# Patient Record
Sex: MALE | Race: WHITE | NOT HISPANIC OR LATINO | Employment: FULL TIME | URBAN - NONMETROPOLITAN AREA
[De-identification: names, ages, dates, MRNs, and addresses within clinical notes are randomized per-mention and may not be internally consistent; named-entity substitution may affect disease eponyms.]

---

## 2019-04-25 ENCOUNTER — HOSPITAL ENCOUNTER (OUTPATIENT)
Facility: HOSPITAL | Age: 54
Setting detail: HOSPITAL OUTPATIENT SURGERY
End: 2019-04-25
Attending: INTERNAL MEDICINE | Admitting: INTERNAL MEDICINE

## 2019-09-12 ENCOUNTER — ANESTHESIA (OUTPATIENT)
Dept: GASTROENTEROLOGY | Facility: HOSPITAL | Age: 54
End: 2019-09-12

## 2019-09-12 ENCOUNTER — HOSPITAL ENCOUNTER (OUTPATIENT)
Facility: HOSPITAL | Age: 54
Setting detail: HOSPITAL OUTPATIENT SURGERY
Discharge: HOME | End: 2019-09-12
Attending: INTERNAL MEDICINE | Admitting: INTERNAL MEDICINE

## 2019-09-12 ENCOUNTER — ANESTHESIA EVENT (OUTPATIENT)
Dept: GASTROENTEROLOGY | Facility: HOSPITAL | Age: 54
End: 2019-09-12

## 2019-09-12 VITALS
RESPIRATION RATE: 18 BRPM | SYSTOLIC BLOOD PRESSURE: 109 MMHG | OXYGEN SATURATION: 99 % | BODY MASS INDEX: 28.58 KG/M2 | WEIGHT: 193 LBS | DIASTOLIC BLOOD PRESSURE: 62 MMHG | HEART RATE: 55 BPM | HEIGHT: 69 IN | TEMPERATURE: 97.4 F

## 2019-09-12 PROCEDURE — 25010000002 PROPOFOL 10 MG/ML EMULSION: Performed by: NURSE ANESTHETIST, CERTIFIED REGISTERED

## 2019-09-12 RX ORDER — LIDOCAINE HYDROCHLORIDE 20 MG/ML
INJECTION, SOLUTION INTRAVENOUS AS NEEDED
Status: DISCONTINUED | OUTPATIENT
Start: 2019-09-12 | End: 2019-09-12 | Stop reason: SURG

## 2019-09-12 RX ORDER — PROPOFOL 10 MG/ML
VIAL (ML) INTRAVENOUS AS NEEDED
Status: DISCONTINUED | OUTPATIENT
Start: 2019-09-12 | End: 2019-09-12 | Stop reason: SURG

## 2019-09-12 RX ORDER — DEXTROSE AND SODIUM CHLORIDE 5; .45 G/100ML; G/100ML
30 INJECTION, SOLUTION INTRAVENOUS CONTINUOUS PRN
Status: DISCONTINUED | OUTPATIENT
Start: 2019-09-12 | End: 2019-09-12 | Stop reason: HOSPADM

## 2019-09-12 RX ORDER — ONDANSETRON 2 MG/ML
4 INJECTION INTRAMUSCULAR; INTRAVENOUS ONCE AS NEEDED
Status: DISCONTINUED | OUTPATIENT
Start: 2019-09-12 | End: 2019-09-12 | Stop reason: HOSPADM

## 2019-09-12 RX ADMIN — LIDOCAINE HYDROCHLORIDE 60 MG: 20 INJECTION, SOLUTION INTRAVENOUS at 08:18

## 2019-09-12 RX ADMIN — PROPOFOL 20 MG: 10 INJECTION, EMULSION INTRAVENOUS at 08:20

## 2019-09-12 RX ADMIN — PROPOFOL 20 MG: 10 INJECTION, EMULSION INTRAVENOUS at 08:28

## 2019-09-12 RX ADMIN — PROPOFOL 20 MG: 10 INJECTION, EMULSION INTRAVENOUS at 08:24

## 2019-09-12 RX ADMIN — PROPOFOL 20 MG: 10 INJECTION, EMULSION INTRAVENOUS at 08:26

## 2019-09-12 RX ADMIN — DEXTROSE AND SODIUM CHLORIDE 30 ML/HR: 5; 450 INJECTION, SOLUTION INTRAVENOUS at 08:10

## 2019-09-12 RX ADMIN — PROPOFOL 20 MG: 10 INJECTION, EMULSION INTRAVENOUS at 08:22

## 2019-09-12 RX ADMIN — PROPOFOL 80 MG: 10 INJECTION, EMULSION INTRAVENOUS at 08:18

## 2019-09-12 RX ADMIN — PROPOFOL 20 MG: 10 INJECTION, EMULSION INTRAVENOUS at 08:19

## 2019-09-12 NOTE — ANESTHESIA POSTPROCEDURE EVALUATION
Patient: Alex Guadalupe    Procedure Summary     Date:  09/12/19 Room / Location:  [facility] ENDOSCOPY 2 / [facility] ENDOSCOPY    Anesthesia Start:  0817 Anesthesia Stop:  0832    Procedure:  COLONOSCOPY (N/A ) Diagnosis:       Screen for colon cancer      (Screen for colon cancer [Z12.11])    Surgeon:  Keny Cardona DO Provider:  Tyrese Newton CRNA    Anesthesia Type:  MAC ASA Status:  1          Anesthesia Type: MAC  Last vitals  BP   128/75 (09/12/19 0800)   Temp   97 °F (36.1 °C) (09/12/19 0800)   Pulse   62 (09/12/19 0800)   Resp   18 (09/12/19 0800)     SpO2   97 % (09/12/19 0800)     Post Anesthesia Care and Evaluation    Patient location during evaluation: PACU  Level of consciousness: sleepy but conscious  Pain score: 0  Pain management: adequate  Airway patency: patent  Anesthetic complications: No anesthetic complications  PONV Status: none  Cardiovascular status: acceptable and hemodynamically stable  Respiratory status: acceptable and spontaneous ventilation  Hydration status: acceptable

## 2019-09-12 NOTE — H&P
Miley Diggs DO,[facility]  Gastroenterology  Hepatology  Endoscopy  Board Certified in Internal Medicine and gastroenterology  [address], [address]  [place]. [address]  - [phone]   F - [phone]     GASTROENTEROLOGY HISTORY AND PHYSICAL  NOTE   MILEY DIGGS DO.         SUBJECTIVE:   9/12/2019    Name: Alex Guadalupe  DOD: 1965        Chief Complaint:     Subjective : Screening for colon cancer    Patient is 54 y.o. male presents with desire for elective colonoscopy.      ROS/HISTORY/ CURRENT MEDICATIONS/OBJECTIVE/VS/PE:   Review of Systems:  All systems unremarkable unless specified below.  Constitutional   HENT  Eyes   Respiratory    Cardiovascular  Gastrointestinal   Endocrine  Genitourinary    Musculoskeletal   Skin  Allergic/Immunologic    Neurological    Hematological  Psychiatric/Behavioral    History:   History reviewed. No pertinent past medical history.  Past Surgical History:   Procedure Laterality Date   • KNEE ARTHROSCOPY W/ MENISCECTOMY       Family History   Problem Relation Age of Onset   • Dementia Mother    • Colon polyps Father      Social History     Tobacco Use   • Smoking status: Never Smoker   • Smokeless tobacco: Never Used   Substance Use Topics   • Alcohol use: No     Frequency: Never   • Drug use: No     Medications Prior to Admission   Medication Sig Dispense Refill Last Dose   • Multiple Vitamins-Minerals (DAILY MULTIVITAMIN PO) Take 1 tablet by mouth Daily.   9/9/2019     Allergies:  Latex    I have reviewed the patients medical history, surgical history and family history in the available medical record system.     Current Medications:     Current Facility-Administered Medications   Medication Dose Route Frequency Provider Last Rate Last Dose   • dextrose 5 % and sodium chloride 0.45 % infusion  30 mL/hr Intravenous Continuous PRN Miley Diggs DO           Objective     Physical Exam:        Physical Exam:  General Appearance:    Alert,  cooperative, in no acute distress   Head:    Normocephalic, without obvious abnormality, atraumatic   Eyes:            Lids and lashes normal, conjunctivae and sclerae normal, no   icterus, no pallor, corneas clear, PERRLA   Ears:    Ears appear intact with no abnormalities noted   Throat:   No oral lesions, no thrush, oral mucosa moist   Neck:   No adenopathy, supple, trachea midline, no thyromegaly, no     carotid bruit, no JVD   Back:     No kyphosis present, no scoliosis present, no skin lesions,       erythema or scars, no tenderness to percussion or                   palpation,   range of motion normal   Lungs:     Clear to auscultation,respirations regular, even and                   unlabored    Heart:    Regular rhythm and normal rate, normal S1 and S2, no            murmur, no gallop, no rub, no click   Breast Exam:    Deferred   Abdomen:     Normal bowel sounds, no masses, no organomegaly, soft        non-tender, non-distended, no guarding, no rebound                 tenderness   Genitalia:    Deferred   Extremities:   Moves all extremities well, no edema, no cyanosis, no              redness   Pulses:   Pulses palpable and equal bilaterally   Skin:   No bleeding, bruising or rash   Lymph nodes:   No palpable adenopathy   Neurologic:   Cranial nerves 2 - 12 grossly intact, sensation intact, DTR        present and equal bilaterally      Results Review:     No results found for: WBC, HGB, HCT, PLT          No results found for: LIPASE  No results found for: INR       Radiology Review:  Imaging Results (last 72 hours)     ** No results found for the last 72 hours. **           I reviewed the patient's new clinical results.  I reviewed the patient's new imaging results and agree with the interpretation.     ASSESSMENT/PLAN:   ASSESSMENT:  1.  Screen for colon cancer    PLAN:  1.  Colonoscopy    Risk and benefits associated with the procedure are reviewed with the patient.  Patient wished to proceed      Keny Cardona DO  09/12/19  7:58 AM

## 2019-09-12 NOTE — ANESTHESIA PREPROCEDURE EVALUATION
Anesthesia Evaluation     Patient summary reviewed and Nursing notes reviewed   NPO Solid Status: > 8 hours  NPO Liquid Status: > 2 hours           Airway   Mallampati: II  TM distance: >3 FB  Dental      Pulmonary - negative pulmonary ROS and normal exam   Cardiovascular - negative cardio ROS and normal exam        Neuro/Psych- negative ROS  GI/Hepatic/Renal/Endo - negative ROS     Musculoskeletal (-) negative ROS    Abdominal  - normal exam   Substance History - negative use     OB/GYN          Other - negative ROS                       Anesthesia Plan    ASA 1     MAC     intravenous induction   Anesthetic plan, all risks, benefits, and alternatives have been provided, discussed and informed consent has been obtained with: patient.

## 2019-11-29 ENCOUNTER — HOSPITAL ENCOUNTER (EMERGENCY)
Facility: HOSPITAL | Age: 54
Discharge: HOME | End: 2019-11-29
Attending: EMERGENCY MEDICINE | Admitting: EMERGENCY MEDICINE

## 2019-11-29 VITALS
WEIGHT: 201 LBS | RESPIRATION RATE: 18 BRPM | HEIGHT: 69 IN | BODY MASS INDEX: 29.77 KG/M2 | DIASTOLIC BLOOD PRESSURE: 88 MMHG | HEART RATE: 57 BPM | SYSTOLIC BLOOD PRESSURE: 141 MMHG | TEMPERATURE: 97.9 F | OXYGEN SATURATION: 99 %

## 2019-11-29 DIAGNOSIS — R04.0 LEFT-SIDED NOSEBLEED: Primary | ICD-10-CM

## 2019-11-29 PROCEDURE — 99283 EMERGENCY DEPT VISIT LOW MDM: CPT

## 2019-11-29 RX ORDER — LIDOCAINE HYDROCHLORIDE 40 MG/ML
1 SOLUTION TOPICAL ONCE
Status: COMPLETED | OUTPATIENT
Start: 2019-11-29 | End: 2019-11-29

## 2019-11-29 RX ADMIN — SILVER NITRATE APPLICATORS 1 APPLICATION: 25; 75 STICK TOPICAL at 17:04

## 2019-11-29 RX ADMIN — PHENYLEPHRINE HYDROCHLORIDE 2 SPRAY: 0.5 SPRAY NASAL at 17:04

## 2019-11-29 RX ADMIN — LIDOCAINE HYDROCHLORIDE 1 APPLICATION: 40 SOLUTION TOPICAL at 17:04

## 2019-12-04 ENCOUNTER — OFFICE VISIT (OUTPATIENT)
Dept: OTOLARYNGOLOGY | Facility: CLINIC | Age: 54
End: 2019-12-04

## 2019-12-04 VITALS
SYSTOLIC BLOOD PRESSURE: 122 MMHG | HEIGHT: 69 IN | BODY MASS INDEX: 29.59 KG/M2 | DIASTOLIC BLOOD PRESSURE: 84 MMHG | WEIGHT: 199.8 LBS

## 2019-12-04 DIAGNOSIS — R04.0 NASAL BLEEDING: Primary | ICD-10-CM

## 2019-12-04 DIAGNOSIS — J34.2 NASAL SEPTAL DEFORMITY: ICD-10-CM

## 2019-12-04 PROCEDURE — 99203 OFFICE O/P NEW LOW 30 MIN: CPT | Performed by: OTOLARYNGOLOGY

## 2019-12-04 PROCEDURE — 30901 CONTROL OF NOSEBLEED: CPT | Performed by: OTOLARYNGOLOGY

## 2019-12-04 RX ORDER — CHLORAL HYDRATE 500 MG
CAPSULE ORAL
COMMUNITY

## 2019-12-08 NOTE — PROGRESS NOTES
Subjective   Alex Guadalupe is a 54 y.o. male.     History of Present Illness   Patient reports she has been having left-sided nosebleeds lately.  Has been going on for a couple of weeks.  Went to the emergency room this past Friday and had silver nitrate cautery on the left however had recurrent bleeding.  Bled again this morning.  No previous nasal surgery or injury.  Is not anticoagulated.      The following portions of the patient's history were reviewed and updated as appropriate: allergies, current medications, past family history, past medical history, past social history, past surgical history and problem list.      Alex Guadalupe reports that he has never smoked. He has never used smokeless tobacco. He reports that he does not drink alcohol or use drugs.  Patient is not a tobacco user and has not been counseled for use of tobacco products    Family History   Problem Relation Age of Onset   • Dementia Mother    • Colon polyps Father        Allergies   Allergen Reactions   • Latex Rash         Current Outpatient Medications:   •  COD LIVER OIL PO, Take  by mouth., Disp: , Rfl:   •  Multiple Vitamins-Minerals (DAILY MULTIVITAMIN PO), Take 1 tablet by mouth Daily., Disp: , Rfl:   •  Omega-3 Fatty Acids (FISH OIL) 1000 MG capsule capsule, Take  by mouth Daily With Breakfast., Disp: , Rfl:     No past medical history on file.    Past Surgical History:   Procedure Laterality Date   • COLONOSCOPY N/A 9/12/2019    Procedure: COLONOSCOPY;  Surgeon: Keny Cardona DO;  Location: [facility] ENDOSCOPY;  Service: Gastroenterology   • KNEE ARTHROSCOPY W/ MENISCECTOMY           Review of Systems   Constitutional: Negative for fever.   HENT: Positive for congestion, hearing loss, nosebleeds, postnasal drip and sore throat.    All other systems reviewed and are negative.          Objective   Physical Exam  General: Well-developed well-nourished male in no acute distress.  Alert and oriented x3. Head:  Normocephalic. Face: Symmetrical strength and appearance. PERRL. EOMI. Voice:Strong. Speech:Fluent  Ears: External ears no deformity, canals no discharge, tympanic membranes intact clear and mobile bilaterally.  Nose: Nares show no discharge mass polyp or purulence.  Boggy mucosa is present.  No gross external deformity.  Septum: To the left  Oral cavity: Lips and gums without lesions.  Tongue and floor of mouth without lesions.  Parotid and submandibular ducts unobstructed.  No mucosal lesions on the buccal mucosa or vestibule of the mouth.  Pharynx: No erythema exudate mass or ulcer  Neck: No lymphadenopathy.  No thyromegaly.  Trachea and larynx midline.  No masses in the parotid or submandibular glands.    Rk-Synephrine and Xylocaine were instilled in the nares bilaterally on cotton and allowed to remain for approximately 5 minutes.  Upon removal there was an obvious bleeding site of the left anterior septum that began bleeding after removal of the cotton.  This was cauterized with silver nitrate multiple applications.  Hemostasis was obtained.        Assessment/Plan   Alex was seen today for nose bleed.    Diagnoses and all orders for this visit:    Nasal bleeding    Nasal septal deformity        Plan: Silver nitrate cautery as described above.  Advise using nasal saline spray 2 sprays each nostril 3 or 4 times a day.  Avoid any manipulation of the nose.  Demonstrated for him how to perform external digital compression which should handle most nosebleeds.  Weeks, sooner for problems.

## 2019-12-26 ENCOUNTER — OFFICE VISIT (OUTPATIENT)
Dept: OTOLARYNGOLOGY | Facility: CLINIC | Age: 54
End: 2019-12-26

## 2019-12-26 VITALS — OXYGEN SATURATION: 98 % | HEIGHT: 69 IN | WEIGHT: 201 LBS | BODY MASS INDEX: 29.77 KG/M2

## 2019-12-26 DIAGNOSIS — Z48.813 AFTERCARE FOLLOWING SURGERY OF THE RESPIRATORY SYSTEM: Primary | ICD-10-CM

## 2019-12-26 DIAGNOSIS — J34.2 NASAL SEPTAL DEFORMITY: ICD-10-CM

## 2019-12-26 PROCEDURE — 99212 OFFICE O/P EST SF 10 MIN: CPT | Performed by: OTOLARYNGOLOGY

## 2019-12-26 NOTE — PROGRESS NOTES
Subjective   Alex Guadalupe is a 54 y.o. male.       History of Present Illness   Patient was seen previously with epistaxis and a septal deformity to the left.  Had an active bleeder that was cauterized on the left.  Reports he has had no further bleeding.  Has had some crusting but he has not been manipulating it.      The following portions of the patient's history were reviewed and updated as appropriate: allergies, current medications, past family history, past medical history, past social history, past surgical history and problem list.     reports that he has never smoked. He has never used smokeless tobacco. He reports that he does not drink alcohol or use drugs.   Patient is not a tobacco user and has not been counseled for use of tobacco products      Review of Systems        Objective   Physical Exam  Nares: Septum to the left with a modest crust inferiorly at the site that was previously cauterized.  No blood or clot.      Assessment/Plan   Alex was seen today for follow-up.    Diagnoses and all orders for this visit:    Aftercare following surgery of the respiratory system    Nasal septal deformity        Plan: Continue saline spray periodically.  Avoid manipulation.  Call for recurrent bleeding.

## 2023-01-23 ENCOUNTER — TRANSCRIBE ORDERS (OUTPATIENT)
Dept: PHYSICAL THERAPY | Facility: HOSPITAL | Age: 58
End: 2023-01-23
Payer: COMMERCIAL

## 2023-01-23 ENCOUNTER — HOSPITAL ENCOUNTER (OUTPATIENT)
Dept: PHYSICAL THERAPY | Facility: HOSPITAL | Age: 58
Setting detail: THERAPIES SERIES
Discharge: HOME | End: 2023-01-23
Payer: COMMERCIAL

## 2023-01-23 DIAGNOSIS — M54.16 RIGHT LUMBAR RADICULOPATHY: ICD-10-CM

## 2023-01-23 DIAGNOSIS — M54.40 LOW BACK PAIN WITH SCIATICA, SCIATICA LATERALITY UNSPECIFIED, UNSPECIFIED BACK PAIN LATERALITY, UNSPECIFIED CHRONICITY: Primary | ICD-10-CM

## 2023-01-23 PROCEDURE — 97162 PT EVAL MOD COMPLEX 30 MIN: CPT | Performed by: PHYSICAL THERAPIST

## 2023-01-23 NOTE — THERAPY EVALUATION
Outpatient Physical Therapy Ortho Initial Evaluation  [facility]     Patient Name: Alex Guadalupe  : 1965  MRN: [id number]  Today's Date: 2023      Visit Date: 2023    Attendance:  (authorization required)  Subjective Improvement: n/a  Next MD Appt: PRN  Recert Date: 23    Therapy Diagnosis: R SI dysfunction; R LE radiculopathy       History reviewed. No pertinent past medical history.     Past Surgical History:   Procedure Laterality Date   • COLONOSCOPY N/A 2019    Procedure: COLONOSCOPY;  Surgeon: Keny Cardona DO;  Location: [facility] ENDOSCOPY;  Service: Gastroenterology   • KNEE ARTHROSCOPY W/ MENISCECTOMY         Visit Dx:     ICD-10-CM ICD-9-CM   1. Low back pain with sciatica, sciatica laterality unspecified, unspecified back pain laterality, unspecified chronicity  M54.40 724.3   2. Right lumbar radiculopathy  M54.16 724.4          Patient History     Row Name 23 1500             History    Date Current Problem(s) Began 23  -SS      Brief Description of Current Complaint Patient reports that he was on an hour drive. Reports that it felt like he was sitting on his wallet even though the wallet was not in his back pocket. Right lower extremity was uncomfortable. Right lower extremity became progressively more painful. He states that he has the inability to sit for very long. He is only sleeping for approximately 1 hour at a time. Does not tolerate laying supine. Right lower extremity is tingly and lateral foot is numb. Sharp pain more down side of R LE today. Walking helps the most. Left lateral flexion helps how he feels. Patient presented to [facility] walk-in clinic on 23 for examination. Steroid pills were prescribed at that time. Steroids are helping some. Having pain in center of right glute. Lives in a 2 story house with 0 steps to enter from garage, 2 steps to enter front/rear, and 15 steps between levels.  -SS    "   Patient/Caregiver Goals Relieve pain  -SS      Patient/Caregiver Goals Comment --  \"Not to go to the doctor. No surgery. I don't like taking pills.\"  -SS      Current Tobacco Use no  -SS      Smoking Status never  -SS      Patient's Rating of General Health Very good  -SS      Occupation/sports/leisure activities [employer] - , no lost work time. Hobbies: fishing  -SS      What clinical tests have you had for this problem? X-ray  -SS      Results of Clinical Tests \"maybe some compression\"  -SS         Pain     Pain Location Back;Leg  glute  -SS      Pain at Present 2  -SS      Pain at Best 0  -SS      Pain at Worst 6;7  -SS      Pain Frequency --  occasional relief but fairly constant  -SS      Pain Description Dull;Aching  sharp today  -SS      What Performance Factors Make the Current Problem(s) WORSE? sitting, laying, being still  -SS      What Performance Factors Make the Current Problem(s) BETTER? walking, left lateral flexion of trunk, steroid medication  -SS      Is your sleep disturbed? Yes  -SS      Is medication used to assist with sleep? Yes  melatonin occasionally  -SS      Difficulties at work? pain  -SS      Difficulties with ADL's? pain; difficulty sleeping due to pain  -SS      Difficulties with recreational activities? pain  -SS         Fall Risk Assessment    Any falls in the past year: No  -SS      Does patient have a fear of falling No  -SS         Daily Activities    Primary Language English  -         Safety    Are you being hurt, hit, or frightened by anyone at home or in your life? No  -SS      Have you had any of the following issues with N/A  -SS            User Key  (r) = Recorded By, (t) = Taken By, (c) = Cosigned By    Initials Name Provider Type    SS Andrew Crump, PT, DPT, CHT Physical Therapist                 PT Ortho     Row Name 01/23/23 1500       Subjective Comments    Subjective Comments see Therapy Patient History  -SS       " "Subjective Pain    Able to rate subjective pain? yes  -    Pre-Treatment Pain Level 2  -    Post-Treatment Pain Level 1  -       Posture/Observations    Posture/Observations Comments No gait deviation. Standing slightly leaned forward (normal for him). Decreased lower lumbar lordosis.  -       Lumbar/SI Special Tests    SLR (Neural Tension) Left:;Negative  right: \"very tight in the hamstrings and back of the knee\", glute soreness  -    Lumbar/SI Special Tests Comments TTP R piriformis. Anterior rotation R hemipelvis. Nontender to palpation ASIS, PSIS, lumbars, trunk musculature.  -SS       Head/Neck/Trunk    Trunk Extension AROM WNLs  -SS    Trunk Flexion AROM WNLs  -SS    Trunk Lt Lateral Flexion AROM WNLs  -SS    Trunk Rt Lateral Flexion AROM WNLs  -SS          User Key  (r) = Recorded By, (t) = Taken By, (c) = Cosigned By    Initials Name Provider Type    Andrew Pelayo, PT, DPT, CHT Physical Therapist                            Therapy Education  Education Details: seated piriformis stretch, HS stretch  Given: HEP  Program: New  How Provided: Verbal, Demonstration  Provided to: Patient  Level of Understanding: Verbalized, Demonstrated      PT OP Goals     Row Name 01/23/23 1500          PT Short Term Goals    STG Date to Achieve --  STGs deferred  -        Long Term Goals    LTG Date to Achieve 02/20/23  -     LTG 1 Independent with HEP/self-management.  -     LTG 2 Resume normal sleep patterns.  -     LTG 3 Modified Oswestry score </= 10/50.  -     LTG 4 Resume PLOF.  -        Time Calculation    PT Goal Re-Cert Due Date 02/20/23  -           User Key  (r) = Recorded By, (t) = Taken By, (c) = Cosigned By    Initials Name Provider Type    Andrew Pelayo, PT, DPT, CHT Physical Therapist                 PT Assessment/Plan     Row Name 01/23/23 1500          PT Assessment    Functional Limitations Limitation in home management;Limitations in community " activities;Limitations in functional capacity and performance;Performance in leisure activities;Performance in self-care ADL;Performance in work activities  -     Impairments Pain;Joint mobility  -     Assessment Comments Alignment corrected by manual therapy. Decreased overall pain, but still painful to have pressure of edge of table on HS, after manual. R SI dysfunction vs discal.  -     Rehab Potential Good  -SS     Patient/caregiver participated in establishment of treatment plan and goals Yes  -SS     Patient would benefit from skilled therapy intervention Yes  -SS        PT Plan    PT Frequency 1x/week;2x/week  -SS     Predicted Duration of Therapy Intervention (PT) 3-4 weeks  -     Planned CPT's? PT EVAL MOD COMPLEXITY: 74087;PT THER PROC EA 15 MIN: 41268;PT THER ACT EA 15 MIN: 31793;PT MANUAL THERAPY EA 15 MIN: 65517;PT HOT OR COLD PACK TREAT MCARE;PT ELECTRICAL STIM UNATTEND:   -     PT Plan Comments Manual therapy (joint mobilization, ME, MFR, dry needling), stretching, strengthening, ice with or without IFC estim as needed for pain.  -           User Key  (r) = Recorded By, (t) = Taken By, (c) = Cosigned By    Initials Name Provider Type     Andrew Crump, PT, DPT, CHT Physical Therapist                    Manual Rx (last 36 hours)     Manual Treatments     Row Name 01/23/23 1500             Manual Rx 1    Manual Rx 1 Location lumbar spine  -      Manual Rx 1 Type joint mobilization  -SS      Manual Rx 1 Grade 3  -SS         Manual Rx 2    Manual Rx 2 Location R hemipelvis  -      Manual Rx 2 Type ME anterior rotation correction  -         Manual Rx 3    Manual Rx 3 Type ME shotgun  -            User Key  (r) = Recorded By, (t) = Taken By, (c) = Cosigned By    Initials Name Provider Type     Andrew Crump, PT, DPT, CHT Physical Therapist                            Outcome Measure Options: Modified Oswestry  Modified Oswestry  Modified Oswestry Score/Comments:  18/50      Time Calculation:     Start Time: 1520  Stop Time: 1604  Time Calculation (min): 44 min     Therapy Charges for Today     Code Description Service Date Service Provider Modifiers Qty    46238730054 HC PT EVAL MOD COMPLEXITY 3 1/23/2023 Andrew Crump, PT, DPT, CHT GP 1                   Andrew Crump, PT, DPT, CHT  1/23/2023

## 2023-01-30 ENCOUNTER — HOSPITAL ENCOUNTER (OUTPATIENT)
Dept: PHYSICAL THERAPY | Facility: HOSPITAL | Age: 58
Setting detail: THERAPIES SERIES
Discharge: HOME | End: 2023-01-30
Payer: COMMERCIAL

## 2023-01-30 DIAGNOSIS — M54.40 LOW BACK PAIN WITH SCIATICA, SCIATICA LATERALITY UNSPECIFIED, UNSPECIFIED BACK PAIN LATERALITY, UNSPECIFIED CHRONICITY: Primary | ICD-10-CM

## 2023-01-30 DIAGNOSIS — M54.16 RIGHT LUMBAR RADICULOPATHY: ICD-10-CM

## 2023-01-30 PROCEDURE — G0283 ELEC STIM OTHER THAN WOUND: HCPCS

## 2023-01-30 PROCEDURE — 97110 THERAPEUTIC EXERCISES: CPT

## 2023-01-30 PROCEDURE — 97140 MANUAL THERAPY 1/> REGIONS: CPT

## 2023-02-06 ENCOUNTER — HOSPITAL ENCOUNTER (OUTPATIENT)
Dept: PHYSICAL THERAPY | Facility: HOSPITAL | Age: 58
Discharge: HOME | End: 2023-02-06
Admitting: ORTHOPAEDIC SURGERY
Payer: COMMERCIAL

## 2023-02-06 DIAGNOSIS — M54.40 LOW BACK PAIN WITH SCIATICA, SCIATICA LATERALITY UNSPECIFIED, UNSPECIFIED BACK PAIN LATERALITY, UNSPECIFIED CHRONICITY: Primary | ICD-10-CM

## 2023-02-06 DIAGNOSIS — M54.16 RIGHT LUMBAR RADICULOPATHY: ICD-10-CM

## 2023-02-06 PROCEDURE — 97140 MANUAL THERAPY 1/> REGIONS: CPT

## 2023-02-06 PROCEDURE — 97110 THERAPEUTIC EXERCISES: CPT

## 2023-02-06 NOTE — THERAPY TREATMENT NOTE
Outpatient Physical Therapy Ortho Treatment Note  [facility]     Patient Name: Alex Guadalupe  : 1965  MRN: [id number]  Today's Date: 2023      Visit Date: 2023   Attendance: 3/3  Subjective improvement: 30%  Recert: 23  MD Appointment: PRN      Visit Dx:    ICD-10-CM ICD-9-CM   1. Low back pain with sciatica, sciatica laterality unspecified, unspecified back pain laterality, unspecified chronicity  M54.40 724.3   2. Right lumbar radiculopathy  M54.16 724.4       There is no problem list on file for this patient.       No past medical history on file.     Past Surgical History:   Procedure Laterality Date   • COLONOSCOPY N/A 2019    Procedure: COLONOSCOPY;  Surgeon: Keny Cardona DO;  Location: [facility] ENDOSCOPY;  Service: Gastroenterology   • KNEE ARTHROSCOPY W/ MENISCECTOMY                          PT Assessment/Plan     Row Name 23 1100          PT Assessment    Functional Limitations Limitation in home management;Limitations in community activities;Limitations in functional capacity and performance;Performance in leisure activities;Performance in self-care ADL;Performance in work activities  -EM     Impairments Pain;Joint mobility  -EM     Assessment Comments Pt kirstin tx well, he reports a 30% improvement at this time. Pt does have concerns regarding numbness over the past week in R LE-unsure if related to shingles. Piriformis muscle integrity much improved this date.  -EM     Rehab Potential Good  -EM     Patient/caregiver participated in establishment of treatment plan and goals Yes  -EM     Patient would benefit from skilled therapy intervention Yes  -EM        PT Plan    PT Frequency 2x/week;1x/week  -EM     Predicted Duration of Therapy Intervention (PT) 3-4 wks  -EM     PT Plan Comments Manual PRN. Begin to progress core strength as tolerated.  -EM           User Key  (r) = Recorded By, (t) = Taken By, (c) = Cosigned By    Initials Name Provider Type     "Tonio Hernandez PTA Physical Therapist Assistant                   OP Exercises     Row Name 02/06/23 1100             Subjective Comments    Subjective Comments Pt feels PT is helping. He got a TENS unit for home. Been compliant with STM/TPR via spouse and compliant with HEP. Pt reports numbness in R foot the past week.  -EM         Subjective Pain    Able to rate subjective pain? yes  -EM      Pre-Treatment Pain Level 2  -EM      Post-Treatment Pain Level 2  -EM         Exercise 1    Exercise Name 1 PRO II-4.0  -EM      Time 1 10'  -EM         Exercise 2    Exercise Name 2 Manual R piriformis  -EM      Time 2 12'  -EM         Exercise 3    Exercise Name 3 Sup Piriformis S  -EM      Sets 3 3  -EM      Time 3 30\"  -EM         Exercise 4    Exercise Name 4 R SL Clamshells with TB  -EM      Sets 4 1  -EM      Reps 4 20  -EM      Additional Comments GTB  -EM         Exercise 5    Exercise Name 5 DKTC S  -EM      Sets 5 3  -EM      Time 5 30\"  -EM         Exercise 6    Exercise Name 6 Bridges  -EM      Sets 6 1  -EM      Reps 6 20  -EM         Exercise 7    Exercise Name 7 SL Hip Abd  -EM      Sets 7 1  -EM      Reps 7 20  -EM         Exercise 8    Exercise Name 8 SL Reverse Clamshells  -EM      Sets 8 1  -EM      Reps 8 20  -EM            User Key  (r) = Recorded By, (t) = Taken By, (c) = Cosigned By    Initials Name Provider Type    Tonio Hernandez PTA Physical Therapist Assistant                         Manual Rx (last 36 hours)     Manual Treatments     Row Name 02/06/23 1300             Manual Rx 1    Manual Rx 1 Location R piriformis  -EM      Manual Rx 1 Type STM/TPR  -EM      Manual Rx 1 Duration 12'  -EM            User Key  (r) = Recorded By, (t) = Taken By, (c) = Cosigned By    Initials Name Provider Type    Tonio Hernandez PTA Physical Therapist Assistant                 PT OP Goals     Row Name 02/06/23 1300          PT Short Term Goals    STG Date to Achieve --  STGs deferred  -EM        Long Term " Goals    LTG Date to Achieve 02/20/23  -EM     LTG 1 Independent with HEP/self-management.  -EM     LTG 1 Progress Not Met  -EM     LTG 2 Resume normal sleep patterns.  -EM     LTG 2 Progress Not Met  -EM     LTG 3 Modified Oswestry score </= 10/50.  -EM     LTG 3 Progress Not Met  -EM     LTG 4 Resume PLOF.  -EM     LTG 4 Progress Not Met  -EM        Time Calculation    PT Goal Re-Cert Due Date 02/20/23  -EM           User Key  (r) = Recorded By, (t) = Taken By, (c) = Cosigned By    Initials Name Provider Type    EM Tonio Ortega, JACOB Physical Therapist Assistant                               Time Calculation:   Start Time: 1102  Stop Time: 1155  Time Calculation (min): 53 min  Total Timed Code Minutes- PT: 53 minute(s)  Therapy Charges for Today     Code Description Service Date Service Provider Modifiers Qty    18144893256 HC PT THER PROC EA 15 MIN 2/6/2023 Tonio Ortega, PTA GP, CQ 3    44965027381 HC PT MANUAL THERAPY EA 15 MIN 2/6/2023 Tonio Ortega, JACOB GP, CQ 1                    Tonio Ortega PTA  2/6/2023

## 2023-02-13 ENCOUNTER — HOSPITAL ENCOUNTER (OUTPATIENT)
Dept: PHYSICAL THERAPY | Facility: HOSPITAL | Age: 58
Setting detail: THERAPIES SERIES
Discharge: HOME | End: 2023-02-13
Payer: COMMERCIAL

## 2023-02-13 DIAGNOSIS — M54.16 RIGHT LUMBAR RADICULOPATHY: ICD-10-CM

## 2023-02-13 DIAGNOSIS — M54.40 LOW BACK PAIN WITH SCIATICA, SCIATICA LATERALITY UNSPECIFIED, UNSPECIFIED BACK PAIN LATERALITY, UNSPECIFIED CHRONICITY: Primary | ICD-10-CM

## 2023-02-13 PROCEDURE — 97140 MANUAL THERAPY 1/> REGIONS: CPT

## 2023-02-13 PROCEDURE — 97110 THERAPEUTIC EXERCISES: CPT

## 2023-02-13 NOTE — THERAPY RE-EVALUATION
Outpatient Physical Therapy Ortho Re-Assessment  [facility]     Patient Name: Alex Guadalupe  : 1965  MRN: [id number]  Today's Date: 2023      Visit Date: 2023     Attendance: 4/5 authorized  Subjective % improved:25%  Recert Due Date:3/6/23  MD appt: TBD    Therapy diagnosis: Piriformis syndrome/Neural tension      There is no problem list on file for this patient.       No past medical history on file.     Past Surgical History:   Procedure Laterality Date   • COLONOSCOPY N/A 2019    Procedure: COLONOSCOPY;  Surgeon: Keny Cardona DO;  Location: [facility] ENDOSCOPY;  Service: Gastroenterology   • KNEE ARTHROSCOPY W/ MENISCECTOMY         Visit Dx:     ICD-10-CM ICD-9-CM   1. Low back pain with sciatica, sciatica laterality unspecified, unspecified back pain laterality, unspecified chronicity  M54.40 724.3   2. Right lumbar radiculopathy  M54.16 724.4              PT Ortho     Row Name 23 1300       Subjective Comments    Subjective Comments Pt reports ~25% improvement since SOC. Pt reports that the more active he is the better his leg feels. He can only sleep to 2 hours consecutive at the most. Typically he wakes up every 30-60 minutes and has to walk around to decrease the pain. He reports that after the PTA he worked with at his last visit worked through his piriformis the pain has been better. He describes that pain as more of an ache than a sharp stabbing pain like it had been. He currently has an active case of shingles from his mid-shin to the bottom of his foot. He has had increased numbness in his right foot since onset of the shingles. He states that it feels like needles whenever he touches the top of his foot on something.  -ND       Subjective Pain    Post-Treatment Pain Level --  1-2  -ND       Lumbosacral Accessory Motions    PA Glide- L3 Right:;WNL  -ND    PA Glide- L4 --  Right normal mobility, pt c/o slight pain  -ND    PA Glide- L5 Right:;WNL  -ND     "PA glide- Sacral base Right:;WNL  -ND    PA glide- Sacral apex Right:;WNL  -ND    Innominate rotation Right:;WNL  -ND       Lumbar/SI Special Tests    SLR (Neural Tension) Right:;Negative  c/o tight HS  -ND    SI Compression Test (SI Dysfunction) Right:;Negative  -ND    MABEL (hip vs. SI Dysfunction) Right:;Negative  -ND    Lumbar/SI Special Tests Comments Positive right lumbar quadrant with pain in posterior hip. Equal leg length with symmetrical pelvic alignment noted.  -ND       Lumbosacral Palpation    Lumbosacral Palpation? --  Tenderness and hypertonicity of right piriformis. No TTP at glute med/min, QL, or Lx multifidi  -ND       General ROM    GENERAL ROM COMMENTS Lumbar AROM WNL. No pain with over pressure  -ND       Head/Neck/Trunk    Trunk Extension AROM WNL  -ND    Trunk Flexion AROM WNL  -ND    Trunk Lt Lateral Flexion AROM WNL  -ND    Trunk Rt Lateral Flexion AROM WNL  -ND          User Key  (r) = Recorded By, (t) = Taken By, (c) = Cosigned By    Initials Name Provider Type    Trung Hough, PT Physical Therapist                            Therapy Education  Education Details: Reassessment Finding, TDN physiological respone  How Provided: Verbal  Provided to: Patient  Level of Understanding: Verbalized      PT OP Goals     Row Name 02/13/23 1300          PT Short Term Goals    STG Date to Achieve --  STGs deferred  -ND        Long Term Goals    LTG Date to Achieve 02/20/23  -ND     LTG 1 Independent with HEP/self-management.  -ND     LTG 1 Progress Met  -ND     LTG 2 Resume normal sleep patterns.  -ND     LTG 2 Progress Not Met  \"No change\"  -ND     LTG 3 Modified Oswestry score </= 10/50.  -ND     LTG 3 Progress Not Met  -ND     LTG 3 Progress Comments 33/50, patient is unable to work right now d/t active shingles case  -ND     LTG 4 Resume PLOF.  -ND     LTG 4 Progress Not Met  -ND        Time Calculation    PT Goal Re-Cert Due Date 03/06/23  -ND           User Key  (r) = Recorded By, (t) = Taken " By, (c) = Cosigned By    Initials Name Provider Type    Trung Hough, PT Physical Therapist                 PT Assessment/Plan     Row Name 02/13/23 1300          PT Assessment    Functional Limitations Limitation in home management;Limitations in community activities;Limitations in functional capacity and performance;Performance in leisure activities;Performance in self-care ADL;Performance in work activities  -ND     Impairments Pain;Joint mobility  -ND     Assessment Comments Pt has made fair progress through 4 visits with PT meeting 1/4 LTG established at initial evaluation. Pt reports ~25% improvement since SOC. He reports no change in his sleep with continued multiple night disturbances. Pt has an active case of shingles from his right mid-shin to the bottom of his foot. He has not been able to go to work d/t not being able to wear the required steel-toed boots. Pt with negative special tests for lumbar or hip joint involvement. Pt responded well to TDN of right piriformis today with good twitch response noted. Pt reported decreased tightness and deep aching pain in posterior hip following needling. Pt demonstrates understanding of HEP. Pt would benefit from continued skilled PT intervention to address myofascial restrictions and improve mobility and strength to facilitate a return to his PLOF.  -ND     Rehab Potential Good  -ND     Patient/caregiver participated in establishment of treatment plan and goals Yes  -ND     Patient would benefit from skilled therapy intervention Yes  -ND        PT Plan    PT Frequency 2x/week;1x/week  -ND     Predicted Duration of Therapy Intervention (PT) 4 weeks  -ND     PT Plan Comments Continued manual to address myofascial restrictions of right piriformis. Progress with lumbopelvic strengthening as tolerated.  -ND           User Key  (r) = Recorded By, (t) = Taken By, (c) = Cosigned By    Initials Name Provider Type    Trung Hough PT Physical Therapist                    OP Exercises     Row Name 02/13/23 1300             Subjective Comments    Subjective Comments Pt reports ~25% improvement since SOC. Pt reports that the more active he is the better his leg feels. He can only sleep to 2 hours consecutive at the most. Typically he wakes up every 30-60 minutes and has to walk around to decrease the pain. He reports that after the PTA he worked with at his last visit worked through his piriformis the pain has been better. He describes that pain as more of an ache than a sharp stabbing pain like it had been. He currently has an active case of shingles from his mid-shin to the bottom of his foot. He has had increased numbness in his right foot since onset of the shingles. He states that it feels like needles whenever he touches the top of his foot on something.  -ND         Subjective Pain    Able to rate subjective pain? yes  -ND      Pre-Treatment Pain Level --  2-3  -ND      Post-Treatment Pain Level --  1-2  -ND         Total Minutes    03463 - PT Therapeutic Exercise Minutes 44  -ND      50884 - PT Manual Therapy Minutes 9  -ND         Exercise 1    Exercise Name 1 PRO II, Lvl 3.0  -ND      Time 1 6'  -ND      Additional Comments Following TDN  -ND         Exercise 2    Exercise Name 2 Reasessment  -ND         Exercise 3    Exercise Name 3 See Manual  -ND      Time 3 9'  -ND            User Key  (r) = Recorded By, (t) = Taken By, (c) = Cosigned By    Initials Name Provider Type    ND Trung Cain, PT Physical Therapist              Manual Rx (last 36 hours)     Manual Treatments     Row Name 02/13/23 1300             Total Minutes    10435 - PT Manual Therapy Minutes 9  -ND         Manual Rx 1    Manual Rx 1 Location R piriformis  -ND      Manual Rx 1 Type TDN  -ND      Manual Rx 1 Duration 4'  -ND         Manual Rx 2    Manual Rx 2 Location R piriformis  -ND      Manual Rx 2 Type prone pin & stretch  -ND      Manual Rx 2 Duration 5'  -ND            User Key  (r) =  Recorded By, (t) = Taken By, (c) = Cosigned By    Initials Name Provider Type    ND Trung Cain, PT Physical Therapist                            Outcome Measure Options: Modified Oswestry  Modified Oswestry  Modified Oswestry Score/Comments: 33/50      Time Calculation:     Start Time: 1300  Stop Time: 1353  Time Calculation (min): 53 min  Total Timed Code Minutes- PT: 53 minute(s)  Timed Charges  90276 - PT Therapeutic Exercise Minutes: 44  84849 - PT Manual Therapy Minutes: 9  Total Minutes  Timed Charges Total Minutes: 53   Total Minutes: 53     Therapy Charges for Today     Code Description Service Date Service Provider Modifiers Qty    13223679507 HC PT MANUAL THERAPY EA 15 MIN 2/13/2023 Trung Cain, PT GP 1    10697056260 HC PT THER PROC EA 15 MIN 2/13/2023 Trung Cain, PT GP 3          PT G-Codes  Outcome Measure Options: Modified Oswestry  Modified Oswestry Score/Comments: 33/50         Trung Cain, PT  2/13/2023

## 2023-02-20 ENCOUNTER — HOSPITAL ENCOUNTER (OUTPATIENT)
Dept: PHYSICAL THERAPY | Facility: HOSPITAL | Age: 58
Setting detail: THERAPIES SERIES
Discharge: HOME | End: 2023-02-20
Payer: COMMERCIAL

## 2023-02-20 PROCEDURE — 97140 MANUAL THERAPY 1/> REGIONS: CPT

## 2023-02-20 PROCEDURE — 97110 THERAPEUTIC EXERCISES: CPT

## 2023-02-20 NOTE — THERAPY TREATMENT NOTE
Outpatient Physical Therapy Ortho Treatment Note  [facility]     Patient Name: Alex Guadalupe  : 1965  MRN: [id number]  Today's Date: 2023      Visit Date: 2023     Attendance:  authorized  Subjective % improved:25%  Recert Due Date:3/6/23  MD appt: TBD     Therapy diagnosis: Piriformis syndrome/Neural tension    Visit Dx:  No diagnosis found.    There is no problem list on file for this patient.       No past medical history on file.     Past Surgical History:   Procedure Laterality Date   • COLONOSCOPY N/A 2019    Procedure: COLONOSCOPY;  Surgeon: Keny Cardona DO;  Location: [facility] ENDOSCOPY;  Service: Gastroenterology   • KNEE ARTHROSCOPY W/ MENISCECTOMY          PT Ortho     Row Name 23 1100       Subjective Comments    Subjective Comments Pt reports decreased hip pain following TDN at his last therapy session. He states that the pain seems to be getting better each time that he has the piriformis worked on during PT session. However, he does report increased lower leg pain and swelling d/t the shingles outbreak.  -ND       Subjective Pain    Able to rate subjective pain? yes  -ND    Pre-Treatment Pain Level 3  -ND    Post-Treatment Pain Level 2  -ND          User Key  (r) = Recorded By, (t) = Taken By, (c) = Cosigned By    Initials Name Provider Type    ND Trung Cain, PT Physical Therapist                             PT Assessment/Plan     Row Name 23 1100          PT Assessment    Functional Limitations Limitation in home management;Limitations in community activities;Limitations in functional capacity and performance;Performance in leisure activities;Performance in self-care ADL;Performance in work activities  -ND     Impairments Pain;Joint mobility  -ND     Assessment Comments Pt demonstrates antalgic gait upon arrival with limited knee flexion and increased hip circumduction to advance RLE. Pt reports increased pain and swelling in his lower leg  d/t his shingles outbreak. Pt was able to progress with addition of modified pigeon pose on treatment table and prone hip IR/ER for mobility. Pt also able to progress with resisted standing hip 3-way for strengthening. Pt was in agreement with plan to be placed on hold with PT at this time d/t limited ability to progress with his curent shingles outbreak that is affecting his gait. Pt's HEP was updated and he was instructed to give us a call if there is a negative change in his symptoms.  -ND     Rehab Potential Good  -ND     Patient/caregiver participated in establishment of treatment plan and goals Yes  -ND     Patient would benefit from skilled therapy intervention Yes  -ND        PT Plan    PT Frequency 2x/week;1x/week  -ND     Predicted Duration of Therapy Intervention (PT) 4 weeks  -ND     PT Plan Comments Pt to be placed on hold from PT at this time d/t plateau in progress because of the pain and swelling in his lower right leg d/t the shingles outbreak.  -ND           User Key  (r) = Recorded By, (t) = Taken By, (c) = Cosigned By    Initials Name Provider Type    ND Trung Cain, PT Physical Therapist                   OP Exercises     Row Name 02/20/23 1100             Subjective Comments    Subjective Comments Pt reports decreased hip pain following TDN at his last therapy session. He states that the pain seems to be getting better each time that he has the piriformis worked on during PT session. However, he does report increased lower leg pain and swelling d/t the shingles outbreak.  -ND         Subjective Pain    Able to rate subjective pain? yes  -ND      Pre-Treatment Pain Level 3  -ND      Post-Treatment Pain Level 2  -ND         Total Minutes    17032 - PT Therapeutic Exercise Minutes 28  -ND      89512 - PT Manual Therapy Minutes 10  -ND         Exercise 1    Exercise Name 1 PRO II, [place] Lvl 4.5  -ND      Time 1 10'  -ND         Exercise 2    Exercise Name 2 Modified [place] Pose  -ND       "Cueing 2 Verbal;Demo  -ND      Reps 2 3  -ND      Time 2 20\"  -ND         Exercise 3    Exercise Name 3 See Manual  -ND      Time 3 10'  -ND         Exercise 4    Exercise Name 4 Prone windshield wipers  -ND      Cueing 4 Verbal;Tactile  -ND      Reps 4 20  -ND         Exercise 5    Exercise Name 5 Standing hip 3-way  -ND      Sets 5 2  -ND      Reps 5 10  -ND      Additional Comments GTB  -ND            User Key  (r) = Recorded By, (t) = Taken By, (c) = Cosigned By    Initials Name Provider Type    Trung Hough, PT Physical Therapist                         Manual Rx (last 36 hours)     Manual Treatments     Row Name 02/20/23 1100             Total Minutes    36121 - PT Manual Therapy Minutes 10  -ND         Manual Rx 1    Manual Rx 1 Location R piriformis  -ND      Manual Rx 1 Type prone pin & stretch  -ND      Manual Rx 1 Duration 6'  -ND         Manual Rx 2    Manual Rx 2 Location Right hip  -ND      Manual Rx 2 Type Prone PA glide  -ND      Manual Rx 2 Duration 4'  -ND            User Key  (r) = Recorded By, (t) = Taken By, (c) = Cosigned By    Initials Name Provider Type    Trung Hough, PT Physical Therapist                 PT OP Goals     Row Name 02/20/23 1100          PT Short Term Goals    STG Date to Achieve --  STGs deferred  -ND        Long Term Goals    LTG Date to Achieve 02/20/23  -ND     LTG 1 Independent with HEP/self-management.  -ND     LTG 1 Progress Met  -ND     LTG 2 Resume normal sleep patterns.  -ND     LTG 2 Progress Not Met  \"No change\"  -ND     LTG 3 Modified Oswestry score </= 10/50.  -ND     LTG 3 Progress Not Met  -ND     LTG 4 Resume PLOF.  -ND     LTG 4 Progress Not Met  -ND        Time Calculation    PT Goal Re-Cert Due Date 03/06/23  -ND           User Key  (r) = Recorded By, (t) = Taken By, (c) = Cosigned By    Initials Name Provider Type    Trung Hough, PT Physical Therapist                Therapy Education  Education Details: Addition of modified pigeon pose stretch, " prone hip IR/ER, and resisted standing hip 3-way to HEP. Pt educated on being placed on hold with PT until his shingles outbreak resolves.  Given: HEP, Symptoms/condition management  Program: Progressed  How Provided: Verbal, Written  Provided to: Patient  Level of Understanding: Verbalized              Time Calculation:   Start Time: 1100  Stop Time: 1148  Time Calculation (min): 48 min  PT Non-Billable Time (min): 10 min (Setting up and taking last patient off e-stim)  Total Timed Code Minutes- PT: 38 minute(s)  Timed Charges  87701 - PT Therapeutic Exercise Minutes: 28  26671 - PT Manual Therapy Minutes: 10  Total Minutes  Timed Charges Total Minutes: 38   Total Minutes: 38  Therapy Charges for Today     Code Description Service Date Service Provider Modifiers Qty    [id number]  PT THER PROC EA 15 MIN 2/20/2023 Trung Cain, PT GP 2    [id number]  PT MANUAL THERAPY EA 15 MIN 2/20/2023 Trung Cain, PT GP 1                    Trung Cain PT  2/20/2023

## (undated) DEVICE — CANN SMPL SOFTECH BIFLO ETCO2 A/M 7FT